# Patient Record
Sex: MALE | Race: WHITE | Employment: OTHER | URBAN - NONMETROPOLITAN AREA
[De-identification: names, ages, dates, MRNs, and addresses within clinical notes are randomized per-mention and may not be internally consistent; named-entity substitution may affect disease eponyms.]

---

## 2022-10-25 ENCOUNTER — OFFICE VISIT (OUTPATIENT)
Dept: CARDIOLOGY CLINIC | Age: 82
End: 2022-10-25
Payer: MEDICARE

## 2022-10-25 VITALS
HEART RATE: 76 BPM | WEIGHT: 173 LBS | BODY MASS INDEX: 22.93 KG/M2 | HEIGHT: 73 IN | SYSTOLIC BLOOD PRESSURE: 122 MMHG | DIASTOLIC BLOOD PRESSURE: 82 MMHG

## 2022-10-25 DIAGNOSIS — R53.82 CHRONIC FATIGUE: ICD-10-CM

## 2022-10-25 DIAGNOSIS — I45.10 RBBB (RIGHT BUNDLE BRANCH BLOCK): ICD-10-CM

## 2022-10-25 DIAGNOSIS — R94.31 ABNORMAL EKG: ICD-10-CM

## 2022-10-25 DIAGNOSIS — R06.02 SOB (SHORTNESS OF BREATH) ON EXERTION: Primary | ICD-10-CM

## 2022-10-25 DIAGNOSIS — E78.5 HYPERLIPIDEMIA, UNSPECIFIED HYPERLIPIDEMIA TYPE: ICD-10-CM

## 2022-10-25 DIAGNOSIS — R42 DIZZINESS ON STANDING: ICD-10-CM

## 2022-10-25 DIAGNOSIS — R42 VERTIGO: ICD-10-CM

## 2022-10-25 PROCEDURE — G8420 CALC BMI NORM PARAMETERS: HCPCS | Performed by: INTERNAL MEDICINE

## 2022-10-25 PROCEDURE — G8427 DOCREV CUR MEDS BY ELIG CLIN: HCPCS | Performed by: INTERNAL MEDICINE

## 2022-10-25 PROCEDURE — G8484 FLU IMMUNIZE NO ADMIN: HCPCS | Performed by: INTERNAL MEDICINE

## 2022-10-25 PROCEDURE — 1123F ACP DISCUSS/DSCN MKR DOCD: CPT | Performed by: INTERNAL MEDICINE

## 2022-10-25 PROCEDURE — 4004F PT TOBACCO SCREEN RCVD TLK: CPT | Performed by: INTERNAL MEDICINE

## 2022-10-25 PROCEDURE — 93000 ELECTROCARDIOGRAM COMPLETE: CPT | Performed by: INTERNAL MEDICINE

## 2022-10-25 PROCEDURE — 99204 OFFICE O/P NEW MOD 45 MIN: CPT | Performed by: INTERNAL MEDICINE

## 2022-10-25 RX ORDER — ATORVASTATIN CALCIUM 40 MG/1
40 TABLET, FILM COATED ORAL DAILY
COMMUNITY
Start: 2022-09-16

## 2022-10-25 RX ORDER — ASPIRIN 81 MG/1
81 TABLET ORAL DAILY
COMMUNITY
Start: 2022-09-16

## 2022-10-25 NOTE — PROGRESS NOTES
Chief Complaint   Patient presents with    New Patient         Pt is here for: new patient appointment for dizziness and fatigue    EKG was done today: 10/25/2022    Dizziness intermittent for 1 to 2 yrs    Dizziness on  assuming supine - room spinning    Dizziness on standing and walking- mostly from bed - room spinning     Sob on exertion    Fatigue easy- on exertion    Sleep a lot    Nonsmoker    FHx  None for CAD    History reviewed. No pertinent surgical history. Allergies   Allergen Reactions    Penicillins Hives and Rash        History reviewed. No pertinent family history. Social History     Socioeconomic History    Marital status:      Spouse name: Not on file    Number of children: Not on file    Years of education: Not on file    Highest education level: Not on file   Occupational History    Not on file   Tobacco Use    Smoking status: Not on file    Smokeless tobacco: Not on file   Substance and Sexual Activity    Alcohol use: Not on file    Drug use: Not on file    Sexual activity: Not on file   Other Topics Concern    Not on file   Social History Narrative    Not on file     Social Determinants of Health     Financial Resource Strain: Not on file   Food Insecurity: Not on file   Transportation Needs: Not on file   Physical Activity: Not on file   Stress: Not on file   Social Connections: Not on file   Intimate Partner Violence: Not on file   Housing Stability: Not on file       Current Outpatient Medications   Medication Sig Dispense Refill    aspirin 81 MG EC tablet Take 81 mg by mouth daily      atorvastatin (LIPITOR) 40 MG tablet Take 40 mg by mouth daily       No current facility-administered medications for this visit. Review of Systems -     General ROS: negative  Psychological ROS: negative  Hematological and Lymphatic ROS: No history of blood clots or bleeding disorder.    Respiratory ROS: no cough,  or wheezing, the rest see HPI  Cardiovascular ROS: See HPI  Gastrointestinal ROS: negative  Genito-Urinary ROS: no dysuria, trouble voiding, or hematuria  Musculoskeletal ROS: negative  Neurological ROS: no TIA or stroke symptoms  Dermatological ROS: negative      Blood pressure 122/82, pulse 76, height 6' 1\" (1.854 m), weight 173 lb (78.5 kg). Physical Examination:    General appearance - alert, well appearing, and in no distress  HEENT- Pink conjunctiva  , Non-icteri sclera,PERRLA  Mental status - alert, oriented to person, place, and time  Neck - supple, no significant adenopathy, no JVD, or carotid bruits  Chest - clear to auscultation, no wheezes, rales or rhonchi, symmetric air entry  Heart - normal rate, regular rhythm, normal S1, S2, no murmurs, rubs, clicks or gallops  Abdomen - soft, nontender, nondistended, no masses or organomegaly  MELISSA- no CVA or flank tenderness, no suprapubic tenderness  Neurological - alert, oriented, normal speech, no focal findings or movement disorder noted  Musculoskeletal/limbs - no joint tenderness, deformity or swelling   - peripheral pulses normal, no pedal edema, no clubbing or cyanosis  Skin - normal coloration and turgor, no rashes, no suspicious skin lesions noted  Psych- appropriate mood and affect    Lab  No results for input(s): CKTOTAL, CKMB, CKMBINDEX, TROPONINI in the last 72 hours. CBC: No results found for: WBC, RBC, HGB, HCT, MCV, MCH, MCHC, RDW, PLT, MPV  BMP:  No results found for: NA, K, CL, CO2, BUN, LABALBU, CREATININE, CALCIUM, GFRAA, LABGLOM, GLUCOSE, GLU  Hepatic Function Panel:  No results found for: ALKPHOS, ALT, AST, PROT, BILITOT, BILIDIR, IBILI, LABALBU  Magnesium:  No results found for: MG  Warfarin PT/INR:  No components found for: PTPATWAR, PTINRWAR  HgBA1c:  No results found for: LABA1C  FLP:  No results found for: TRIG, HDL, LDLCALC, LDLDIRECT, LABVLDL  TSH:  No results found for: TSH    Ekg 10/25/22  Sinus  Rhythm   -Combined atrial enlargement.     -Right bundle branch block and right axis -possible right ventricular hypertrophy  -consider pulmonary disease. ABNORMAL       Assessment   Diagnosis Orders   1. SOB (shortness of breath) on exertion  ECHO Complete 2D W Doppler W Color    Stress test, lexiscan    TSH with Reflex    NM Tilt Table Test      2. Chronic fatigue  ECHO Complete 2D W Doppler W Color    Stress test, lexiscan    TSH with Reflex    NM Tilt Table Test      3. Dizziness on standing  ECHO Complete 2D W Doppler W Color    Stress test, lexiscan    TSH with Reflex    NM Tilt Table Test      4. Vertigo  Stress test, lexiscan    TSH with Reflex    NM Tilt Table Test      5. Abnormal EKG  ECHO Complete 2D W Doppler W Color    Stress test, lexiscan    TSH with Reflex    NM Tilt Table Test      6. RBBB (right bundle branch block)  Stress test, lexiscan    TSH with Reflex    NM Tilt Table Test      7. Hyperlipidemia, unspecified hyperlipidemia type  NM Tilt Table Test              Plan   Med and labs reviewed    Continue the current treatment and with constant vigilance to changes in symptoms and also any potential side effects. Return for care or seek medical attention immediately if symptoms got worse and/or develop new symptoms.       Fatigue  Sob  Abn ekg  Echo  Natanael nuc stress  TSH with reflex    Dizziness/ vertigo  Bedside orthostatics-- Negative  Get lab from cold water   TTT  Advised to see ENT as well    D/w the pat the plan of care      RTC in 4 weeks      Cait Zhang, [facility]

## 2022-10-26 ENCOUNTER — TELEPHONE (OUTPATIENT)
Dept: ADMINISTRATIVE | Age: 82
End: 2022-10-26

## 2022-10-26 NOTE — TELEPHONE ENCOUNTER
Tilt table, test, trinity stress and echo scheduled for 11/21/22, with arrival time of 9:45 am for tilt. Echo and stress to follow on same day, as patient is coming from [facility]. Patient to fast for six hours prior to arrival and hold caffeine (regular OR decaf) for 24 hours prior. Left voicemail for patient to call to receive date and time, as well as verbal instructions for testing. Instructions also mailed to patient this date. If patient wishes, testing may be split into different dates in the interest of having results sooner. If patient wants all on the same day to avoid multiple trips, the scheduled date of 11/21/22 is first available.

## 2022-11-29 NOTE — TELEPHONE ENCOUNTER
Echo, trinity and tilt table test rescheduled for patient to 12/21/22 with arrival time of 10:45 am for echo, to be followed by trinity and tilt table test. Patient to return call to confirm scheduled date and time prior to mailing instructions to patient.

## 2022-12-02 NOTE — TELEPHONE ENCOUNTER
Spoke with Jose R Rand, patient's wife, to confirm date and time of testing and review instructions; Jose R Cancer verbalized understanding. Instructions mailed to patient.

## 2022-12-21 ENCOUNTER — HOSPITAL ENCOUNTER (OUTPATIENT)
Dept: NON INVASIVE DIAGNOSTICS | Age: 82
Discharge: HOME | End: 2022-12-21
Payer: MEDICARE

## 2022-12-21 DIAGNOSIS — I45.10 RBBB (RIGHT BUNDLE BRANCH BLOCK): ICD-10-CM

## 2022-12-21 DIAGNOSIS — R06.02 SOB (SHORTNESS OF BREATH) ON EXERTION: ICD-10-CM

## 2022-12-21 DIAGNOSIS — R53.82 CHRONIC FATIGUE: ICD-10-CM

## 2022-12-21 DIAGNOSIS — R94.31 ABNORMAL EKG: ICD-10-CM

## 2022-12-21 DIAGNOSIS — R42 DIZZINESS ON STANDING: ICD-10-CM

## 2022-12-21 DIAGNOSIS — R42 VERTIGO: ICD-10-CM

## 2022-12-21 DIAGNOSIS — E78.5 HYPERLIPIDEMIA, UNSPECIFIED HYPERLIPIDEMIA TYPE: ICD-10-CM

## 2022-12-21 PROCEDURE — 93306 TTE W/DOPPLER COMPLETE: CPT

## 2022-12-21 PROCEDURE — A9500 TC99M SESTAMIBI: HCPCS | Performed by: INTERNAL MEDICINE

## 2022-12-21 PROCEDURE — 6360000002 HC RX W HCPCS

## 2022-12-21 PROCEDURE — 78452 HT MUSCLE IMAGE SPECT MULT: CPT | Performed by: INTERNAL MEDICINE

## 2022-12-21 PROCEDURE — 3430000000 HC RX DIAGNOSTIC RADIOPHARMACEUTICAL: Performed by: INTERNAL MEDICINE

## 2022-12-21 PROCEDURE — 93660 TILT TABLE EVALUATION: CPT | Performed by: INTERNAL MEDICINE

## 2022-12-21 PROCEDURE — 93017 CV STRESS TEST TRACING ONLY: CPT | Performed by: INTERNAL MEDICINE

## 2022-12-21 RX ORDER — TECHNETIUM TC-99M SESTAMIBI 1 MG/10ML
9.5 INJECTION INTRAVENOUS
Status: COMPLETED | OUTPATIENT
Start: 2022-12-21 | End: 2022-12-21

## 2022-12-21 RX ORDER — TECHNETIUM TC-99M SESTAMIBI 1 MG/10ML
29.8 INJECTION INTRAVENOUS
Status: COMPLETED | OUTPATIENT
Start: 2022-12-21 | End: 2022-12-21

## 2022-12-21 RX ADMIN — Medication 9.5 MILLICURIE: at 13:20

## 2022-12-21 RX ADMIN — Medication 29.8 MILLICURIE: at 14:05

## 2022-12-22 NOTE — PROCEDURES
[address] [id number]                                TILT TABLE TEST    PATIENT NAME: Andi Beckett                    :        1940  MED REC NO:   [id number]                           ROOM:  ACCOUNT NO:   [de-identified]                           ADMIT DATE: 2022  PROVIDER:     Sandy Avelar. Edi Anders M.D.    Nilton Veronica:  2022    INDICATION:  Dizziness, baseline blood pressure is 158/85, pulse rate  79. PROCEDURE DETAIL:  Under continuous EKG monitoring and intermittent  blood pressure monitoring, the patient was allowed to assume standing  position at 70-degree inclination. Three minutes of tilting, blood  pressure 151/71, pulse rate 74. Ten minutes of tilting, blood pressure  152/75, pulse rate 83. Twenty minutes of tilting, blood pressure  142/75, pulse rate 83. Twenty five minutes of tilting, blood pressure  152/76, pulse rate 88. Thirty minutes of tilting, blood pressure  162/68, pulse rate 76. After thirty minutes of tilting, the patient is  allowed to assume supine position. Second minute in supine position,  blood pressure 139/74, pulse rate 72. Fourth minute in supine position,  blood pressure 176/87, pulse rate 73. SYMPTOMATOLOGY:  The patient has no symptoms throughout tilting. EKG MONITORING:  No arrhythmia, no pause. HEMODYNAMICS:  Blood pressure and heart rate well maintained throughout  the tilting. CONCLUSIONS:  1. This is a benign tilt table study. 2.  Tilt table test is negative for vasovagal response. 3.  Tilt table test is negative for orthostatic hypotension. 4.  Tilt table test is negative for POT (postural orthostatic  tachycardia) syndrome. Gage Ferreira.  Luis Coleman M.D.    D: 2022 17:52:27       T: 2022 22:47:54     [email or web address]  Job#: [id number]     Doc#: [id number]    CC:

## 2022-12-27 ENCOUNTER — OFFICE VISIT (OUTPATIENT)
Dept: CARDIOLOGY CLINIC | Age: 82
End: 2022-12-27
Payer: MEDICARE

## 2022-12-27 VITALS
HEART RATE: 90 BPM | BODY MASS INDEX: 22.64 KG/M2 | SYSTOLIC BLOOD PRESSURE: 138 MMHG | DIASTOLIC BLOOD PRESSURE: 86 MMHG | HEIGHT: 73 IN | WEIGHT: 170.8 LBS

## 2022-12-27 DIAGNOSIS — H81.10 BENIGN PAROXYSMAL POSITIONAL VERTIGO, UNSPECIFIED LATERALITY: ICD-10-CM

## 2022-12-27 DIAGNOSIS — E78.5 HYPERLIPIDEMIA, UNSPECIFIED HYPERLIPIDEMIA TYPE: ICD-10-CM

## 2022-12-27 DIAGNOSIS — I34.0 MODERATE MITRAL REGURGITATION: Primary | ICD-10-CM

## 2022-12-27 DIAGNOSIS — R06.02 SOB (SHORTNESS OF BREATH) ON EXERTION: ICD-10-CM

## 2022-12-27 DIAGNOSIS — R94.31 ABNORMAL EKG: ICD-10-CM

## 2022-12-27 DIAGNOSIS — I45.10 RBBB (RIGHT BUNDLE BRANCH BLOCK): ICD-10-CM

## 2022-12-27 PROBLEM — R42 DIZZINESS ON STANDING: Status: RESOLVED | Noted: 2022-10-25 | Resolved: 2022-12-27

## 2022-12-27 PROCEDURE — G8484 FLU IMMUNIZE NO ADMIN: HCPCS | Performed by: INTERNAL MEDICINE

## 2022-12-27 PROCEDURE — G8420 CALC BMI NORM PARAMETERS: HCPCS | Performed by: INTERNAL MEDICINE

## 2022-12-27 PROCEDURE — 4004F PT TOBACCO SCREEN RCVD TLK: CPT | Performed by: INTERNAL MEDICINE

## 2022-12-27 PROCEDURE — 1123F ACP DISCUSS/DSCN MKR DOCD: CPT | Performed by: INTERNAL MEDICINE

## 2022-12-27 PROCEDURE — G8427 DOCREV CUR MEDS BY ELIG CLIN: HCPCS | Performed by: INTERNAL MEDICINE

## 2022-12-27 PROCEDURE — 99214 OFFICE O/P EST MOD 30 MIN: CPT | Performed by: INTERNAL MEDICINE

## 2022-12-27 RX ORDER — DONEPEZIL HYDROCHLORIDE 5 MG/1
TABLET, FILM COATED ORAL
COMMUNITY
Start: 2022-12-09

## 2022-12-27 NOTE — PROGRESS NOTES
Chief Complaint   Patient presents with    Follow-up     Stress and echo         Originally Pt is here for: new patient appointment for dizziness and fatigue          1 month stress and echo and follow up. EKG done 10-. Denied cp, palpitation or edema    Hx of Dizziness intermittent for 1 to 2 yrs    Dizziness on  assuming supine - room spinning    Dizziness on standing and walking- mostly from bed - room spinning     Sob on exertion    Fatigue easy- on exertion    Sleep a lot    Nonsmoker    FHx  None for CAD    History reviewed. No pertinent surgical history. Allergies   Allergen Reactions    Penicillins Hives and Rash        History reviewed. No pertinent family history.      Social History     Socioeconomic History    Marital status:      Spouse name: Not on file    Number of children: Not on file    Years of education: Not on file    Highest education level: Not on file   Occupational History    Not on file   Tobacco Use    Smoking status: Former     Packs/day: 0.50     Years: 12.00     Pack years: 6.00     Types: Cigarettes     Quit date: 0     Years since quittin.0    Smokeless tobacco: Not on file   Substance and Sexual Activity    Alcohol use: Not on file    Drug use: Not on file    Sexual activity: Not on file   Other Topics Concern    Not on file   Social History Narrative    Not on file     Social Determinants of Health     Financial Resource Strain: Not on file   Food Insecurity: Not on file   Transportation Needs: Not on file   Physical Activity: Not on file   Stress: Not on file   Social Connections: Not on file   Intimate Partner Violence: Not on file   Housing Stability: Not on file       Current Outpatient Medications   Medication Sig Dispense Refill    donepezil (ARICEPT) 5 MG tablet TAKE 1 TABLET BY MOUTH AT BEDTIME FOR 1 MONTH, THEN INCREASE TO 10MG ONCE DAILY AT BEDTIME      aspirin 81 MG EC tablet Take 81 mg by mouth daily      atorvastatin (LIPITOR) 40 MG tablet Take 40 mg by mouth daily       No current facility-administered medications for this visit. Review of Systems -     General ROS: negative  Psychological ROS: negative  Hematological and Lymphatic ROS: No history of blood clots or bleeding disorder. Respiratory ROS: no cough,  or wheezing, the rest see HPI  Cardiovascular ROS: See HPI  Gastrointestinal ROS: negative  Genito-Urinary ROS: no dysuria, trouble voiding, or hematuria  Musculoskeletal ROS: negative  Neurological ROS: no TIA or stroke symptoms  Dermatological ROS: negative      Blood pressure 138/86, pulse 90, height 6' 1\" (1.854 m), weight 170 lb 12.8 oz (77.5 kg). Physical Examination:    General appearance - alert, well appearing, and in no distress  HEENT- Pink conjunctiva  , Non-icteri sclera,PERRLA  Mental status - alert, oriented to person, place, and time  Neck - supple, no significant adenopathy, no JVD, or carotid bruits  Chest - clear to auscultation, no wheezes, rales or rhonchi, symmetric air entry  Heart - normal rate, regular rhythm, normal S1, S2, no murmurs, rubs, clicks or gallops  Abdomen - soft, nontender, nondistended, no masses or organomegaly  MELISSA- no CVA or flank tenderness, no suprapubic tenderness  Neurological - alert, oriented, normal speech, no focal findings or movement disorder noted  Musculoskeletal/limbs - no joint tenderness, deformity or swelling   - peripheral pulses normal, no pedal edema, no clubbing or cyanosis  Skin - normal coloration and turgor, no rashes, no suspicious skin lesions noted  Psych- appropriate mood and affect    Lab  No results for input(s): CKTOTAL, CKMB, CKMBINDEX, TROPONINI in the last 72 hours.   CBC: No results found for: WBC, RBC, HGB, HCT, MCV, MCH, MCHC, RDW, PLT, MPV  BMP:  No results found for: NA, K, CL, CO2, BUN, LABALBU, CREATININE, CALCIUM, GFRAA, LABGLOM, GLUCOSE, GLU  Hepatic Function Panel:  No results found for: ALKPHOS, ALT, AST, PROT, BILITOT, BILIDIR, IBILI, LABALBU  Magnesium:  No results found for: MG  Warfarin PT/INR:  No components found for: PTPATWAR, PTINRWAR  HgBA1c:  No results found for: LABA1C  FLP:  No results found for: TRIG, HDL, LDLCALC, LDLDIRECT, LABVLDL  TSH:  No results found for: TSH       CONCLUSIONS:  1. This is a benign tilt table study. 2.  Tilt table test is negative for vasovagal response. 3.  Tilt table test is negative for orthostatic hypotension. 4.  Tilt table test is negative for POT (postural orthostatic  tachycardia) syndrome. Carla Aponte M.D.     D: 12/21/2022 17:52:     Conclusions      Summary   Lexiscan EKG stress test is not suggestive for ischemia. The nuclear images is not suggestive for myocardial ischemia. Signatures        ----------------------------------------------------------------   Electronically signed by Dain Maloney MD (Interpreting   Cardiologist) on 12/21/2022 at 17:32   ----------------------------------------------------------------       Conclusions      Summary   Normal left ventricle size and Low Normal LV systolic function. Ejection   fraction was estimated at 50 %. There were no regional left ventricular   wall motion abnormalities and wall thickness was within normal limits. Moderate mitral regurgitation is present. Signature      ----------------------------------------------------------------   Electronically signed by Dain Maloney MD (Interpreting   physician) on 12/21/2022 at 05:06 PM   ----------------------------------------------------------------         Ekg 10/25/22  Sinus  Rhythm   -Combined atrial enlargement. -Right bundle branch block and right axis -possible right ventricular hypertrophy  -consider pulmonary disease. ABNORMAL       Assessment   Diagnosis Orders   1. Moderate mitral regurgitation        2. RBBB (right bundle branch block)        3. Hyperlipidemia, unspecified hyperlipidemia type        4. SOB (shortness of breath) on exertion        5.  Benign paroxysmal positional vertigo, unspecified laterality        6. Abnormal EKG                Plan   The Med and labs reviewed    Continue the current treatment and with constant vigilance to changes in symptoms and also any potential side effects. Return for care or seek medical attention immediately if symptoms got worse and/or develop new symptoms. Fatigue  Sob on exertion  Abn ekg  Echo and Natanael nuc stress- WNL  TSH with reflex WNL  Advised to xercise mentally and physically    Dizziness/ vertigo  Bedside orthostatics-- Negative  Get lab from cold water   TTT- negative  Hx of BPPV- now resolved    D/w the pat the plan of care    D/w the pat the finding of the test        Discussed use, benefit, and side effects of prescribed medications. All patient questions answered. Pt voiced understanding. Instructed to continue current medications, diet and exercise. Continue risk factor modification and medical management. Patient agreed with treatment plan. Follow up as directed.     RTC in  6 months    [facility]

## 2023-05-24 ENCOUNTER — TELEPHONE (OUTPATIENT)
Dept: CARDIOLOGY CLINIC | Age: 83
End: 2023-05-24

## 2023-05-24 DIAGNOSIS — I45.10 RBBB (RIGHT BUNDLE BRANCH BLOCK): Primary | ICD-10-CM

## 2023-05-24 DIAGNOSIS — R06.02 SOB (SHORTNESS OF BREATH) ON EXERTION: ICD-10-CM

## 2023-05-24 DIAGNOSIS — R42 DIZZINESS ON STANDING: ICD-10-CM

## 2023-05-24 DIAGNOSIS — H81.10 BENIGN PAROXYSMAL POSITIONAL VERTIGO, UNSPECIFIED LATERALITY: ICD-10-CM

## 2023-05-24 DIAGNOSIS — R94.31 ABNORMAL EKG: ICD-10-CM

## 2023-05-24 DIAGNOSIS — R53.82 CHRONIC FATIGUE: ICD-10-CM

## 2023-05-24 NOTE — TELEPHONE ENCOUNTER
Leena with Dr Shell Lopes office LM ON our VM  ([phone])  Saying there is a holter report with a fib on it that was done In [place],   I called their office back to talk to nurse Yelena Medellin but she was in a room with a patient   I left a message with the  to have her call us back       we do not have that report and we have not seen this pt since 12/2022    Pt will need an appt

## 2023-05-25 NOTE — TELEPHONE ENCOUNTER
Pt wife jamee state afib is showing on a holter, and has not been addressed, tried to call dr office again and had to jamee to have them please fax us the report.

## 2023-05-26 NOTE — TELEPHONE ENCOUNTER
Reviewed the report of the holter done sept 16, 2022  Dr. Eva Saab read it on sept 21, 2022 and did not document or mention atr fib in his reading conclusion. In the narrative someone wrote atr fib, but the reading does not seem to agree and hence not in his concusion. My recommendation  Event monitor for 1 month to be done in [facility] to R/o and rule in atr fib.

## 2023-06-07 ENCOUNTER — HOSPITAL ENCOUNTER (OUTPATIENT)
Dept: NON INVASIVE DIAGNOSTICS | Age: 83
Discharge: HOME | End: 2023-06-07
Attending: INTERNAL MEDICINE
Payer: MEDICARE

## 2023-06-07 DIAGNOSIS — I45.10 RBBB (RIGHT BUNDLE BRANCH BLOCK): ICD-10-CM

## 2023-06-07 DIAGNOSIS — R94.31 ABNORMAL EKG: ICD-10-CM

## 2023-06-07 DIAGNOSIS — R53.82 CHRONIC FATIGUE: ICD-10-CM

## 2023-06-07 DIAGNOSIS — R42 DIZZINESS ON STANDING: ICD-10-CM

## 2023-06-07 DIAGNOSIS — R06.02 SOB (SHORTNESS OF BREATH) ON EXERTION: ICD-10-CM

## 2023-06-07 DIAGNOSIS — H81.10 BENIGN PAROXYSMAL POSITIONAL VERTIGO, UNSPECIFIED LATERALITY: ICD-10-CM

## 2023-06-07 PROCEDURE — 93270 REMOTE 30 DAY ECG REV/REPORT: CPT
